# Patient Record
Sex: FEMALE | Race: OTHER
[De-identification: names, ages, dates, MRNs, and addresses within clinical notes are randomized per-mention and may not be internally consistent; named-entity substitution may affect disease eponyms.]

---

## 2019-08-14 ENCOUNTER — HOSPITAL ENCOUNTER (OUTPATIENT)
Dept: HOSPITAL 15 - RAD HDHVI | Age: 39
Discharge: HOME | End: 2019-08-14
Attending: INTERNAL MEDICINE
Payer: COMMERCIAL

## 2019-08-14 VITALS — SYSTOLIC BLOOD PRESSURE: 126 MMHG | DIASTOLIC BLOOD PRESSURE: 79 MMHG

## 2019-08-14 VITALS — SYSTOLIC BLOOD PRESSURE: 116 MMHG | DIASTOLIC BLOOD PRESSURE: 73 MMHG

## 2019-08-14 DIAGNOSIS — I10: ICD-10-CM

## 2019-08-14 DIAGNOSIS — R94.4: ICD-10-CM

## 2019-08-14 DIAGNOSIS — I70.1: Primary | ICD-10-CM

## 2019-08-14 PROCEDURE — 74175 CTA ABDOMEN W/CONTRAST: CPT

## 2019-08-14 PROCEDURE — 82565 ASSAY OF CREATININE: CPT

## 2019-08-14 PROCEDURE — 36415 COLL VENOUS BLD VENIPUNCTURE: CPT

## 2019-08-14 NOTE — NUR
IV insertion

IV access obtained, via clean sterile technique by inserting 20 gauge catheter at  after  
attempt(s). IV secured properly. No trauma to site. Patient tolerated procedure well.

## 2019-08-14 NOTE — NUR
Discharge Instructions

See e-MAR for any mediations given with this visit.  Patient education given on disease 
process. Patient verbalized understanding.  Previous labs reviewed.  Patient discharged in 
stable condition PT TO FOLLOW UP WITH MD

## 2019-09-04 ENCOUNTER — HOSPITAL ENCOUNTER (OUTPATIENT)
Dept: HOSPITAL 15 - RAD HDHVI | Age: 39
Discharge: HOME | End: 2019-09-04
Attending: INTERNAL MEDICINE
Payer: COMMERCIAL

## 2019-09-04 VITALS — WEIGHT: 186 LBS | BODY MASS INDEX: 34.23 KG/M2 | HEIGHT: 62 IN

## 2019-09-04 DIAGNOSIS — E87.6: ICD-10-CM

## 2019-09-04 DIAGNOSIS — I47.1: ICD-10-CM

## 2019-09-04 DIAGNOSIS — I34.8: Primary | ICD-10-CM

## 2019-09-04 DIAGNOSIS — I10: ICD-10-CM

## 2019-09-04 PROCEDURE — 93017 CV STRESS TEST TRACING ONLY: CPT

## 2019-09-04 PROCEDURE — 96374 THER/PROPH/DIAG INJ IV PUSH: CPT

## 2019-09-04 PROCEDURE — 93306 TTE W/DOPPLER COMPLETE: CPT

## 2019-09-04 PROCEDURE — 78452 HT MUSCLE IMAGE SPECT MULT: CPT
